# Patient Record
Sex: FEMALE | Race: AMERICAN INDIAN OR ALASKA NATIVE
[De-identification: names, ages, dates, MRNs, and addresses within clinical notes are randomized per-mention and may not be internally consistent; named-entity substitution may affect disease eponyms.]

---

## 2018-06-30 ENCOUNTER — HOSPITAL ENCOUNTER (EMERGENCY)
Dept: HOSPITAL 5 - ED | Age: 33
Discharge: STILL ADMITTED OR ADMINISTRATIVE | End: 2018-06-30
Payer: SELF-PAY

## 2018-06-30 VITALS — DIASTOLIC BLOOD PRESSURE: 97 MMHG | SYSTOLIC BLOOD PRESSURE: 136 MMHG

## 2018-06-30 DIAGNOSIS — F17.200: ICD-10-CM

## 2018-06-30 DIAGNOSIS — R07.9: ICD-10-CM

## 2018-06-30 DIAGNOSIS — I47.1: Primary | ICD-10-CM

## 2018-06-30 DIAGNOSIS — J45.909: ICD-10-CM

## 2018-06-30 LAB
APTT BLD: 25.4 SEC. (ref 24.2–36.6)
BACTERIA #/AREA URNS HPF: (no result) /HPF
BASOPHILS # (AUTO): 0.1 K/MM3 (ref 0–0.1)
BASOPHILS NFR BLD AUTO: 0.9 % (ref 0–1.8)
BENZODIAZEPINES SCREEN,URINE: (no result)
BILIRUB UR QL STRIP: (no result)
BLOOD UR QL VISUAL: (no result)
BUN SERPL-MCNC: 7 MG/DL (ref 7–17)
BUN/CREAT SERPL: 9 %
CALCIUM SERPL-MCNC: 9.7 MG/DL (ref 8.4–10.2)
EOSINOPHIL # BLD AUTO: 0.1 K/MM3 (ref 0–0.4)
EOSINOPHIL NFR BLD AUTO: 1.6 % (ref 0–4.3)
HCT VFR BLD CALC: 42.7 % (ref 30.3–42.9)
HEMOLYSIS INDEX: 7
HGB BLD-MCNC: 14.1 GM/DL (ref 10.1–14.3)
HYALINE CASTS #/AREA URNS LPF: 3 /LPF
INR PPP: 0.91 (ref 0.87–1.13)
LYMPHOCYTES # BLD AUTO: 2 K/MM3 (ref 1.2–5.4)
LYMPHOCYTES NFR BLD AUTO: 24.9 % (ref 13.4–35)
MCH RBC QN AUTO: 29 PG (ref 28–32)
MCHC RBC AUTO-ENTMCNC: 33 % (ref 30–34)
MCV RBC AUTO: 87 FL (ref 79–97)
METHADONE SCREEN,URINE: (no result)
MONOCYTES # (AUTO): 0.6 K/MM3 (ref 0–0.8)
MONOCYTES % (AUTO): 7.5 % (ref 0–7.3)
MUCOUS THREADS #/AREA URNS HPF: (no result) /HPF
OPIATE SCREEN,URINE: (no result)
PH UR STRIP: 6 [PH] (ref 5–7)
PLATELET # BLD: 337 K/MM3 (ref 140–440)
RBC # BLD AUTO: 4.93 M/MM3 (ref 3.65–5.03)
RBC #/AREA URNS HPF: 5 /HPF (ref 0–6)
T4 FREE SERPL-MCNC: 1.28 NG/DL (ref 0.76–1.46)
UROBILINOGEN UR-MCNC: < 2 MG/DL (ref ?–2)
WBC #/AREA URNS HPF: 4 /HPF (ref 0–6)

## 2018-06-30 PROCEDURE — 93010 ELECTROCARDIOGRAM REPORT: CPT

## 2018-06-30 PROCEDURE — 71045 X-RAY EXAM CHEST 1 VIEW: CPT

## 2018-06-30 PROCEDURE — 93005 ELECTROCARDIOGRAM TRACING: CPT

## 2018-06-30 PROCEDURE — 85025 COMPLETE CBC W/AUTO DIFF WBC: CPT

## 2018-06-30 PROCEDURE — 81025 URINE PREGNANCY TEST: CPT

## 2018-06-30 PROCEDURE — 85379 FIBRIN DEGRADATION QUANT: CPT

## 2018-06-30 PROCEDURE — 84443 ASSAY THYROID STIM HORMONE: CPT

## 2018-06-30 PROCEDURE — 80048 BASIC METABOLIC PNL TOTAL CA: CPT

## 2018-06-30 PROCEDURE — 81001 URINALYSIS AUTO W/SCOPE: CPT

## 2018-06-30 PROCEDURE — 36415 COLL VENOUS BLD VENIPUNCTURE: CPT

## 2018-06-30 PROCEDURE — 85610 PROTHROMBIN TIME: CPT

## 2018-06-30 PROCEDURE — 83880 ASSAY OF NATRIURETIC PEPTIDE: CPT

## 2018-06-30 PROCEDURE — 85730 THROMBOPLASTIN TIME PARTIAL: CPT

## 2018-06-30 PROCEDURE — 82553 CREATINE MB FRACTION: CPT

## 2018-06-30 PROCEDURE — 84484 ASSAY OF TROPONIN QUANT: CPT

## 2018-06-30 PROCEDURE — 84439 ASSAY OF FREE THYROXINE: CPT

## 2018-06-30 PROCEDURE — 80307 DRUG TEST PRSMV CHEM ANLYZR: CPT

## 2018-06-30 PROCEDURE — 83735 ASSAY OF MAGNESIUM: CPT

## 2018-06-30 PROCEDURE — 82550 ASSAY OF CK (CPK): CPT

## 2018-06-30 NOTE — XRAY REPORT
FINAL REPORT



PROCEDURE:  Chest. 



TECHNIQUE:  Portable AP views. 



HISTORY:  Chest pain. 



COMPARISON:  No prior studies are available for comparison.



FINDINGS:  

The heart and mediastinum appear normal. The lungs are clear and

well expanded. There are no pleural effusions. The soft tissues

and regional skeleton are unremarkable. 



IMPRESSION:  

Normal study.

## 2018-06-30 NOTE — HISTORY AND PHYSICAL REPORT
History of Present Illness


Chief complaint: 





My heart was beating fast


History of present illness: 


33 YO Female with Obesity, Nicotine Dependence presents to ED for evaluation of 

chest palpitations. Pt seen and evaluated in ED and found to have normal sinus 

rhythm. Pt medically optimized and back to usual state of health. Pt discharged 

home and instructed to F/U cardiology on Monday, July 2, for further care and 

evaluation. Pt denies fever, chills, CP, NVD, Syncope, urgency, frequency, skin 

rash, or recent ill contacts.  








Past History


Past Medical History: other (Obesity, Nicotine Dependence)


Past Surgical History: No surgical history, Other (reviewed)


Social history: single, smoking


Family history: hypertension





Medications and Allergies


 Allergies











Allergy/AdvReac Type Severity Reaction Status Date / Time


 


No Known Allergies Allergy   Verified 06/30/18 12:54














Review of Systems


Constitutional: no weight loss, no weight gain, no fever, no chills


Ears, nose, mouth and throat: no ear pain, no ear discharge, no tinnitis, no 

decreased hearing, no nose pain, no nasal congestion, no nasal discharge


Breasts: no change in shape, no swelling, no mass


Cardiovascular: palpitations, no chest pain, no orthopnea, no edema, no syncope

, no lightheadedness, no shortness of breath, no dyspnea on exertion, no 

paroxysmal nocturnal dyspnea, no claudication, no phlebitis, no high blood 

pressure, no leg edema


Respiratory: no cough, no cough with sputum, no excessive sputum, no hemoptysis

, no shortness of breath


Gastrointestinal: no abdominal pain, no nausea, no vomiting, no diarrhea, no 

constipation


Genitourinary Female: no pelvic pain, no flank pain, no menorrhagia, no dysuria

, no urinary frequency, no urgency


Rectal: no pain, no incontinence, no bleeding


Musculoskeletal: no neck stiffness, no neck pain, no shooting arm pain, no arm 

numbness/tingling, no low back pain, no shooting leg pain, no leg numbness/

tingling


Integumentary: no rash, no pruritis, no redness, no sores, no wounds, no 

jaundice


Neurological: no transient paralysis, no paralysis, no weakness, no parathesias

, no numbness, no tingling, no seizures, no syncope, no tremors


Psychiatric: no anxiety, no memory loss, no change in sleep habits, no sleep 

disturbances, no insomnia, no hypersomnia, no change in appetite


Endocrine: no cold intolerance, no heat intolerance, no polyphagia, no 

excessive thirst, no polyuria, no nocturia, no excessive sweating, no flushing, 

no proptosis, no deepening of the voice, no thyroid mass, no palpatations, no 

high blood sugars


Hematologic/Lymphatic: no easy bruising, no easy bleeding, no lymphadenopathy, 

no lymphedema


Allergic/Immunologic: no urticaria, no allergic rhinitis, no wheezing, no 

persistent infections, no anaphylaxis, no angioedema





Exam





- Constitutional


Vitals: 


 











Temp Pulse Resp BP Pulse Ox


 


 97.8 F   90   16   134/81   100 


 


 06/30/18 12:54  06/30/18 12:54  06/30/18 12:54  06/30/18 12:54  06/30/18 12:54











General appearance: Present: obese





- EENT


Eyes: Present: PERRL


ENT: hearing intact, clear oral mucosa





- Neck


Neck: Present: supple, normal ROM





- Respiratory


Respiratory effort: normal


Respiratory: bilateral: CTA





- Cardiovascular


Heart Sounds: Present: S1 & S2.  Absent: rub, click





- Extremities


Extremities: pulses symmetrical, No edema


Peripheral Pulses: within normal limits





- Abdominal


General gastrointestinal: Present: soft, non-tender, non-distended, normal 

bowel sounds


Female genitourinary: Present: normal





- Integumentary


Integumentary: Present: clear, warm, dry





- Musculoskeletal


Musculoskeletal: gait normal, strength equal bilaterally





- Psychiatric


Psychiatric: appropriate mood/affect, intact judgment & insight





- Neurologic


Neurologic: CNII-XII intact, moves all extremities





Results





- Labs


CBC & Chem 7: 


 06/30/18 13:34





 06/30/18 13:34


Labs: 


 Abnormal lab results











  06/30/18 06/30/18 Range/Units





  13:34 13:39 


 


Mono % (Auto)  7.5 H   (0.0-7.3)  %


 


Total Creatine Kinase   148 H  ()  units/L














Assessment and Plan





- Patient Problems


(1) Sinus tachycardia


Status: Acute   


Plan to address problem: 


Symptoms resolved. D dimer, telemetry, serial EKG. Pt discharged and instructed 

to f/u with cardiology for further testing, [facility] drug screen negative, thyroid 

panel normal. 








(2) Nicotine dependence


Status: Acute   


Qualifiers: 


   Substance use status: uncomplicated 


Plan to address problem: 


smoking cessation.








(3) Obesity (BMI 30.0-34.9)


Status: Acute   


Plan to address problem: 


balanced diet, increased physical activity

## 2018-06-30 NOTE — EMERGENCY DEPARTMENT REPORT
ED General Adult HPI





- General


Chief complaint: Chest Pain


Stated complaint: TOO FAST HEARTBEAT


Time Seen by Provider: 06/30/18 13:27


Source: patient


Mode of arrival: Stretcher


Limitations: No Limitations





- History of Present Illness


Initial comments: 


32-year-old female who arrives via EMS.  Prehospital EKG demonstrates a narrow 

complex sober trigger with tachycardia at about 220.  The patient states she 

had "crushing" substernal chest pain to paramedics.  By the time of her arrival 

the chest pain had improved.  She did note that her heart was racing at that 

time of her chest pain she states.  However she seems to vary on that part of 

the history.  In any case the chest pain was worse with a deep inspiration but 

did not radiate.  The patient's had no recent travel.  At the time of arrival 

she is not complaining of shortness of breath.  She stated that she was very 

sweaty with the chest pain but denied nausea or vomiting.  She states that she 

has never had an episode like this before.





-: Sudden, minutes(s)


Location: chest


Radiation: non-radiation


Quality: crushing


Consistency: now resolved


Improves with: none


Worsens with: none


Associated Symptoms: denies other symptoms (except as listed), chest pain, 

diaphoresis


Treatments Prior to Arrival: none





- Related Data


 Allergies











Allergy/AdvReac Type Severity Reaction Status Date / Time


 


No Known Allergies Allergy   Verified 06/30/18 12:54














ED Review of Systems


ROS: 


Stated complaint: TOO FAST HEARTBEAT


Other details as noted in HPI





Constitutional: denies: chills, fever


Eyes: denies: eye pain, eye discharge, vision change


ENT: denies: ear pain, throat pain


Respiratory: denies: cough, shortness of breath, wheezing


Cardiovascular: as per HPI, chest pain.  denies: palpitations


Endocrine: no symptoms reported


Gastrointestinal: denies: abdominal pain, nausea, diarrhea


Genitourinary: denies: urgency, dysuria, discharge


Musculoskeletal: denies: back pain, joint swelling, arthralgia


Skin: denies: rash, lesions


Neurological: denies: headache, weakness, paresthesias


Psychiatric: denies: anxiety, depression


Hematological/Lymphatic: denies: easy bleeding, easy bruising





ED Past Medical Hx





- Past Medical History


Hx Asthma: Yes





- Surgical History


Past Surgical History?: No





- Social History


Smoking Status: Current Every Day Smoker


Substance Use Type: None





ED Physical Exam





- General


Limitations: No Limitations


General appearance: alert, in no apparent distress, obese





- Head


Head exam: Present: atraumatic, normocephalic





- Eye


Eye exam: Present: normal appearance, PERRL, EOMI.  Absent: scleral icterus





- ENT


ENT exam: Present: mucous membranes moist





- Neck


Neck exam: Present: normal inspection.  Absent: tenderness, meningismus





- Respiratory


Respiratory exam: Present: normal lung sounds bilaterally.  Absent: respiratory 

distress





- Cardiovascular


Cardiovascular Exam: Present: regular rate, normal rhythm.  Absent: systolic 

murmur, diastolic murmur, rubs, gallop





- GI/Abdominal


GI/Abdominal exam: Present: soft, normal bowel sounds.  Absent: distended, 

tenderness, guarding, rebound, rigid





- Extremities Exam


Extremities exam: Present: normal inspection





- Back Exam


Back exam: Present: normal inspection





- Neurological Exam


Neurological exam: Present: alert, oriented X3, CN II-XII intact.  Absent: 

motor sensory deficit





- Psychiatric


Psychiatric exam: Present: normal affect, normal mood





- Skin


Skin exam: Present: warm, dry, intact, normal color.  Absent: rash





ED Course


 Vital Signs











  06/30/18





  12:54


 


Temperature 97.8 F


 


Pulse Rate 90


 


Respiratory 16





Rate 


 


Blood Pressure 134/81


 


O2 Sat by Pulse 100





Oximetry 














- Reevaluation(s)


Reevaluation #1: 


No recurrent chest pain.  Heart rate in the high 90s.  Pulse oximetry is normal.


06/30/18 15:55





Reevaluation #2: 


Discussed with Dr. Haro.  Further care and evaluation per Dr. Haro.


06/30/18 15:58








ED Medical Decision Making





- Lab Data


Result diagrams: 


 06/30/18 13:34





 06/30/18 13:34








 Laboratory Results - last 24 hr











  06/30/18 06/30/18 06/30/18





  13:34 13:34 13:39


 


WBC  7.9  


 


RBC  4.93  


 


Hgb  14.1  


 


Hct  42.7  


 


MCV  87  


 


MCH  29  


 


MCHC  33  


 


RDW  15.0  


 


Plt Count  337  


 


Lymph % (Auto)  24.9  


 


Mono % (Auto)  7.5 H  


 


Eos % (Auto)  1.6  


 


Baso % (Auto)  0.9  


 


Lymph #  2.0  


 


Mono #  0.6  


 


Eos #  0.1  


 


Baso #  0.1  


 


Seg Neutrophils %  65.1  


 


Seg Neutrophils #  5.2  


 


PT   


 


INR   


 


APTT   


 


D-Dimer   


 


Sodium   138 


 


Potassium   4.8 


 


Chloride   98.3 


 


Carbon Dioxide   26 


 


Anion Gap   19 


 


BUN   7 


 


Creatinine   0.8 


 


Estimated GFR   > 60 


 


BUN/Creatinine Ratio   9 


 


Glucose   100 


 


Calcium   9.7 


 


Magnesium   


 


Total Creatine Kinase    148 H


 


CK-MB (CK-2)    1.8


 


CK-MB (CK-2) Rel Index    1.2


 


Troponin T   < 0.010 


 


NT-Pro-B Natriuret Pep   


 


TSH   


 


Free T4   














  06/30/18 06/30/18 06/30/18





  13:39 13:39 13:39


 


WBC   


 


RBC   


 


Hgb   


 


Hct   


 


MCV   


 


MCH   


 


MCHC   


 


RDW   


 


Plt Count   


 


Lymph % (Auto)   


 


Mono % (Auto)   


 


Eos % (Auto)   


 


Baso % (Auto)   


 


Lymph #   


 


Mono #   


 


Eos #   


 


Baso #   


 


Seg Neutrophils %   


 


Seg Neutrophils #   


 


PT  12.7  


 


INR  0.91  


 


APTT  25.4  


 


D-Dimer  222.81  


 


Sodium   


 


Potassium   


 


Chloride   


 


Carbon Dioxide   


 


Anion Gap   


 


BUN   


 


Creatinine   


 


Estimated GFR   


 


BUN/Creatinine Ratio   


 


Glucose   


 


Calcium   


 


Magnesium   1.80 


 


Total Creatine Kinase   


 


CK-MB (CK-2)   


 


CK-MB (CK-2) Rel Index   


 


Troponin T   


 


NT-Pro-B Natriuret Pep   12.06 


 


TSH    2.500


 


Free T4    1.28














- EKG Data


-: EKG Interpreted by Me


EKG shows normal: sinus rhythm, axis, intervals, QRS complexes, ST-T waves


Rate: normal





- EKG Data


Interpretation: no acute changes





- Radiology Data


Radiology results: pending


Critical care attestation.: 


If time is entered above; I have spent that time in minutes in the direct care 

of this critically ill patient, excluding procedure time.








ED Disposition


Clinical Impression: 


 Supraventricular tachycardia





Chest pain


Qualifiers:


 Chest pain type: unspecified Qualified Code(s): R07.9 - Chest pain, unspecified





Disposition: [id number] OP ADMIT IP TO THIS HOSP


Is pt being admited?: Yes


Does the pt Need Aspirin: Yes


Condition: Stable


Instructions:  Chest Pain (ED)


Time of Disposition: 15:58

## 2020-02-24 ENCOUNTER — HOSPITAL ENCOUNTER (EMERGENCY)
Dept: HOSPITAL 5 - ED | Age: 35
Discharge: HOME | End: 2020-02-24
Payer: SELF-PAY

## 2020-02-24 VITALS — DIASTOLIC BLOOD PRESSURE: 64 MMHG | SYSTOLIC BLOOD PRESSURE: 109 MMHG

## 2020-02-24 DIAGNOSIS — F17.200: ICD-10-CM

## 2020-02-24 DIAGNOSIS — I47.1: ICD-10-CM

## 2020-02-24 DIAGNOSIS — I49.9: Primary | ICD-10-CM

## 2020-02-24 DIAGNOSIS — Z79.899: ICD-10-CM

## 2020-02-24 DIAGNOSIS — J45.909: ICD-10-CM

## 2020-02-24 LAB
ALBUMIN SERPL-MCNC: 4.2 G/DL (ref 3.9–5)
ALT SERPL-CCNC: 32 UNITS/L (ref 7–56)
APTT BLD: 24.3 SEC. (ref 24.2–36.6)
BACTERIA #/AREA URNS HPF: (no result) /HPF
BASOPHILS # (AUTO): 0 K/MM3 (ref 0–0.1)
BASOPHILS NFR BLD AUTO: 0.4 % (ref 0–1.8)
BENZODIAZEPINES SCREEN,URINE: (no result)
BILIRUB UR QL STRIP: (no result)
BLOOD UR QL VISUAL: (no result)
BUN SERPL-MCNC: 10 MG/DL (ref 7–17)
BUN/CREAT SERPL: 11 %
CALCIUM SERPL-MCNC: 9.4 MG/DL (ref 8.4–10.2)
EOSINOPHIL # BLD AUTO: 0.1 K/MM3 (ref 0–0.4)
EOSINOPHIL NFR BLD AUTO: 1.3 % (ref 0–4.3)
HCT VFR BLD CALC: 42.4 % (ref 30.3–42.9)
HEMOLYSIS INDEX: 8
HGB BLD-MCNC: 13.9 GM/DL (ref 10.1–14.3)
INR PPP: 1.03 (ref 0.87–1.13)
LYMPHOCYTES # BLD AUTO: 1.4 K/MM3 (ref 1.2–5.4)
LYMPHOCYTES NFR BLD AUTO: 18.8 % (ref 13.4–35)
MCHC RBC AUTO-ENTMCNC: 33 % (ref 30–34)
MCV RBC AUTO: 87 FL (ref 79–97)
METHADONE SCREEN,URINE: (no result)
MONOCYTES # (AUTO): 0.6 K/MM3 (ref 0–0.8)
MONOCYTES % (AUTO): 8.3 % (ref 0–7.3)
MUCOUS THREADS #/AREA URNS HPF: (no result) /HPF
OPIATE SCREEN,URINE: (no result)
PH UR STRIP: 7 [PH] (ref 5–7)
PLATELET # BLD: 312 K/MM3 (ref 140–440)
RBC # BLD AUTO: 4.88 M/MM3 (ref 3.65–5.03)
RBC #/AREA URNS HPF: 1 /HPF (ref 0–6)
UROBILINOGEN UR-MCNC: 2 MG/DL (ref ?–2)
WBC #/AREA URNS HPF: 2 /HPF (ref 0–6)

## 2020-02-24 PROCEDURE — 85025 COMPLETE CBC W/AUTO DIFF WBC: CPT

## 2020-02-24 PROCEDURE — 81001 URINALYSIS AUTO W/SCOPE: CPT

## 2020-02-24 PROCEDURE — 93005 ELECTROCARDIOGRAM TRACING: CPT

## 2020-02-24 PROCEDURE — 80307 DRUG TEST PRSMV CHEM ANLYZR: CPT

## 2020-02-24 PROCEDURE — 71045 X-RAY EXAM CHEST 1 VIEW: CPT

## 2020-02-24 PROCEDURE — 36415 COLL VENOUS BLD VENIPUNCTURE: CPT

## 2020-02-24 PROCEDURE — 85610 PROTHROMBIN TIME: CPT

## 2020-02-24 PROCEDURE — 85730 THROMBOPLASTIN TIME PARTIAL: CPT

## 2020-02-24 PROCEDURE — 93010 ELECTROCARDIOGRAM REPORT: CPT

## 2020-02-24 PROCEDURE — 84443 ASSAY THYROID STIM HORMONE: CPT

## 2020-02-24 PROCEDURE — 83735 ASSAY OF MAGNESIUM: CPT

## 2020-02-24 PROCEDURE — 84436 ASSAY OF TOTAL THYROXINE: CPT

## 2020-02-24 PROCEDURE — 80053 COMPREHEN METABOLIC PANEL: CPT

## 2020-02-24 NOTE — EMERGENCY DEPARTMENT REPORT
ED General Adult HPI





- General


Chief complaint: Arrhythmia/Palpitations


Stated complaint: CHEST PAIN/SVT


Time Seen by Provider: 02/24/20 11:49


Source: patient, EMS


Mode of arrival: Stretcher


Limitations: No Limitations





- History of Present Illness


Initial comments: 





The patient presents to the emergency department the chief complaint of heart 

palpitations that started this morning.  Patient states that she had history of 

SVT 2 to 3 years ago that converted on his own and never followed up with a 

cardiologist.  Patient was given 6 mg of adenosine by EMS and a heart rate 

increased to 83.  Patient denies chest pain currently, shortness breath, or abdo

leanna pain.


-: Sudden


Severity scale (0 -10): 0


Consistency: now resolved


Improves with: none


Worsens with: none


Associated Symptoms: denies other symptoms


Treatments Prior to Arrival: none





- Related Data


                                  Previous Rx's











 Medication  Instructions  Recorded  Last Taken  Type


 


Metoprolol Succinate [Toprol Xl] 25 mg PO QDAY #30 tab.er.24h 02/24/20 Unknown 

Rx











                                    Allergies











Allergy/AdvReac Type Severity Reaction Status Date / Time


 


No Known Allergies Allergy   Verified 06/30/18 12:54














ED Review of Systems


ROS: 


Stated complaint: CHEST PAIN/SVT


Other details as noted in HPI





Comment: All other systems reviewed and negative


Constitutional: denies: chills, fever


Eyes: denies: eye pain, eye discharge, vision change


ENT: denies: ear pain, throat pain


Respiratory: denies: cough, shortness of breath, wheezing


Cardiovascular: palpitations.  denies: chest pain


Endocrine: no symptoms reported


Gastrointestinal: denies: abdominal pain, nausea, diarrhea


Genitourinary: denies: urgency, dysuria, discharge


Musculoskeletal: denies: back pain, joint swelling, arthralgia


Skin: denies: rash, lesions


Neurological: denies: headache, weakness, paresthesias


Psychiatric: denies: anxiety, depression


Hematological/Lymphatic: denies: easy bleeding, easy bruising





ED Past Medical Hx





- Past Medical History


Previous Medical History?: Yes


Hx Asthma: Yes


Additional medical history: SVT





- Surgical History


Past Surgical History?: No





- Social History


Smoking Status: Current Every Day Smoker





- Medications


Home Medications: 


                                Home Medications











 Medication  Instructions  Recorded  Confirmed  Last Taken  Type


 


Metoprolol Succinate [Toprol Xl] 25 mg PO QDAY #30 tab.er.24h 02/24/20  Unknown 

Rx














ED Physical Exam





- General


Limitations: No Limitations


General appearance: alert, in no apparent distress





- Head


Head exam: Present: atraumatic, normocephalic





- Eye


Eye exam: Present: normal appearance, PERRL, EOMI





- ENT


ENT exam: Present: mucous membranes moist





- Neck


Neck exam: Present: normal inspection





- Respiratory


Respiratory exam: Present: normal lung sounds bilaterally.  Absent: respiratory 

distress, wheezes





- Cardiovascular


Cardiovascular Exam: Present: regular rate, normal rhythm.  Absent: systolic 

murmur, diastolic murmur, rubs, gallop





- GI/Abdominal


GI/Abdominal exam: Present: soft, normal bowel sounds.  Absent: distended, 

tenderness





- Extremities Exam


Extremities exam: Present: normal inspection





- Back Exam


Back exam: Present: normal inspection





- Neurological Exam


Neurological exam: Present: alert, oriented X3, CN II-XII intact.  Absent: motor

sensory deficit





- Psychiatric


Psychiatric exam: Present: normal affect, normal mood





- Skin


Skin exam: Present: warm, dry, intact, normal color.  Absent: rash





ED Course


                                   Vital Signs











  02/24/20 02/24/20 02/24/20





  11:42 11:46 12:00


 


Pulse Rate 88  86


 


Respiratory 18  16





Rate   


 


Blood Pressure 108/73  111/72


 


O2 Sat by Pulse 100 100 





Oximetry   














  02/24/20 02/24/20





  13:00 14:00


 


Pulse Rate 90 86


 


Respiratory 16 20





Rate  


 


Blood Pressure 113/80 125/79


 


O2 Sat by Pulse 99 99





Oximetry  














ED Medical Decision Making





- Lab Data


Result diagrams: 


                                 02/24/20 12:10





                                 02/24/20 12:10








                                   Lab Results











  02/24/20 02/24/20 02/24/20 Range/Units





  12:10 12:10 12:10 


 


WBC  7.4    (4.5-11.0)  K/mm3


 


RBC  4.88    (3.65-5.03)  M/mm3


 


Hgb  13.9    (10.1-14.3)  gm/dl


 


Hct  42.4    (30.3-42.9)  %


 


MCV  87    (79-97)  fl


 


MCH  29    (28-32)  pg


 


MCHC  33    (30-34)  %


 


RDW  15.4 H    (13.2-15.2)  %


 


Plt Count  312    (140-440)  K/mm3


 


Lymph % (Auto)  18.8    (13.4-35.0)  %


 


Mono % (Auto)  8.3 H    (0.0-7.3)  %


 


Eos % (Auto)  1.3    (0.0-4.3)  %


 


Baso % (Auto)  0.4    (0.0-1.8)  %


 


Lymph #  1.4    (1.2-5.4)  K/mm3


 


Mono #  0.6    (0.0-0.8)  K/mm3


 


Eos #  0.1    (0.0-0.4)  K/mm3


 


Baso #  0.0    (0.0-0.1)  K/mm3


 


Seg Neutrophils %  71.2 H    (40.0-70.0)  %


 


Seg Neutrophils #  5.3    (1.8-7.7)  K/mm3


 


PT   13.6   (12.2-14.9)  Sec.


 


INR   1.03   (0.87-1.13)  


 


APTT   24.3   (24.2-36.6)  Sec.


 


Sodium    141  (137-145)  mmol/L


 


Potassium    4.5  (3.6-5.0)  mmol/L


 


Chloride    101.1  ()  mmol/L


 


Carbon Dioxide    20 L  (22-30)  mmol/L


 


Anion Gap    24  mmol/L


 


BUN    10  (7-17)  mg/dL


 


Creatinine    0.9  (0.7-1.2)  mg/dL


 


Estimated GFR    > 60  ml/min


 


BUN/Creatinine Ratio    11  %


 


Glucose    87  ()  mg/dL


 


Calcium    9.4  (8.4-10.2)  mg/dL


 


Magnesium    2.10  (1.7-2.3)  mg/dL


 


Total Bilirubin    0.40  (0.1-1.2)  mg/dL


 


AST    20  (5-40)  units/L


 


ALT    32  (7-56)  units/L


 


Alkaline Phosphatase    86  ()  units/L


 


Total Protein    8.0  (6.3-8.2)  g/dL


 


Albumin    4.2  (3.9-5)  g/dL


 


Albumin/Globulin Ratio    1.1  %


 


TSH     (0.270-4.200)  mlU/mL


 


Thyroxine (T4)     (4.0-12.0)  ug/dL


 


Urine Color     (Yellow)  


 


Urine Turbidity     (Clear)  


 


Urine pH     (5.0-7.0)  


 


Ur Specific Gravity     (1.003-1.030)  


 


Urine Protein     (Negative)  mg/dL


 


Urine Glucose (UA)     (Negative)  mg/dL


 


Urine Ketones     (Negative)  mg/dL


 


Urine Blood     (Negative)  


 


Urine Nitrite     (Negative)  


 


Urine Bilirubin     (Negative)  


 


Urine Urobilinogen     (<2.0)  mg/dL


 


Ur Leukocyte Esterase     (Negative)  


 


Urine WBC (Auto)     (0.0-6.0)  /HPF


 


Urine RBC (Auto)     (0.0-6.0)  /HPF


 


U Epithel Cells (Auto)     (0-13.0)  /HPF


 


Urine Bacteria (Auto)     (Negative)  /HPF


 


Urine Mucus     /HPF


 


Urine Opiates Screen     


 


Urine Methadone Screen     


 


Ur Barbiturates Screen     


 


Ur Phencyclidine Scrn     


 


Ur Amphetamines Screen     


 


U Benzodiazepines Scrn     


 


Urine Cocaine Screen     


 


U Marijuana (THC) Screen     


 


Drugs of Abuse Note     














  02/24/20 02/24/20 02/24/20 Range/Units





  12:10 12:10 13:05 


 


WBC     (4.5-11.0)  K/mm3


 


RBC     (3.65-5.03)  M/mm3


 


Hgb     (10.1-14.3)  gm/dl


 


Hct     (30.3-42.9)  %


 


MCV     (79-97)  fl


 


MCH     (28-32)  pg


 


MCHC     (30-34)  %


 


RDW     (13.2-15.2)  %


 


Plt Count     (140-440)  K/mm3


 


Lymph % (Auto)     (13.4-35.0)  %


 


Mono % (Auto)     (0.0-7.3)  %


 


Eos % (Auto)     (0.0-4.3)  %


 


Baso % (Auto)     (0.0-1.8)  %


 


Lymph #     (1.2-5.4)  K/mm3


 


Mono #     (0.0-0.8)  K/mm3


 


Eos #     (0.0-0.4)  K/mm3


 


Baso #     (0.0-0.1)  K/mm3


 


Seg Neutrophils %     (40.0-70.0)  %


 


Seg Neutrophils #     (1.8-7.7)  K/mm3


 


PT     (12.2-14.9)  Sec.


 


INR     (0.87-1.13)  


 


APTT     (24.2-36.6)  Sec.


 


Sodium     (137-145)  mmol/L


 


Potassium     (3.6-5.0)  mmol/L


 


Chloride     ()  mmol/L


 


Carbon Dioxide     (22-30)  mmol/L


 


Anion Gap     mmol/L


 


BUN     (7-17)  mg/dL


 


Creatinine     (0.7-1.2)  mg/dL


 


Estimated GFR     ml/min


 


BUN/Creatinine Ratio     %


 


Glucose     ()  mg/dL


 


Calcium     (8.4-10.2)  mg/dL


 


Magnesium     (1.7-2.3)  mg/dL


 


Total Bilirubin     (0.1-1.2)  mg/dL


 


AST     (5-40)  units/L


 


ALT     (7-56)  units/L


 


Alkaline Phosphatase     ()  units/L


 


Total Protein     (6.3-8.2)  g/dL


 


Albumin     (3.9-5)  g/dL


 


Albumin/Globulin Ratio     %


 


TSH  2.960    (0.270-4.200)  mlU/mL


 


Thyroxine (T4)   8.9   (4.0-12.0)  ug/dL


 


Urine Color    Yani  (Yellow)  


 


Urine Turbidity    Slightly-cloudy  (Clear)  


 


Urine pH    7.0  (5.0-7.0)  


 


Ur Specific Gravity    1.019  (1.003-1.030)  


 


Urine Protein    100 mg/dl  (Negative)  mg/dL


 


Urine Glucose (UA)    50  (Negative)  mg/dL


 


Urine Ketones    20  (Negative)  mg/dL


 


Urine Blood    Neg  (Negative)  


 


Urine Nitrite    Neg  (Negative)  


 


Urine Bilirubin    Neg  (Negative)  


 


Urine Urobilinogen    2.0  (<2.0)  mg/dL


 


Ur Leukocyte Esterase    Tr  (Negative)  


 


Urine WBC (Auto)    2.0  (0.0-6.0)  /HPF


 


Urine RBC (Auto)    1.0  (0.0-6.0)  /HPF


 


U Epithel Cells (Auto)    4.0  (0-13.0)  /HPF


 


Urine Bacteria (Auto)    1+  (Negative)  /HPF


 


Urine Mucus    Few  /HPF


 


Urine Opiates Screen     


 


Urine Methadone Screen     


 


Ur Barbiturates Screen     


 


Ur Phencyclidine Scrn     


 


Ur Amphetamines Screen     


 


U Benzodiazepines Scrn     


 


Urine Cocaine Screen     


 


U Marijuana (THC) Screen     


 


Drugs of Abuse Note     














  02/24/20 Range/Units





  13:05 


 


WBC   (4.5-11.0)  K/mm3


 


RBC   (3.65-5.03)  M/mm3


 


Hgb   (10.1-14.3)  gm/dl


 


Hct   (30.3-42.9)  %


 


MCV   (79-97)  fl


 


MCH   (28-32)  pg


 


MCHC   (30-34)  %


 


RDW   (13.2-15.2)  %


 


Plt Count   (140-440)  K/mm3


 


Lymph % (Auto)   (13.4-35.0)  %


 


Mono % (Auto)   (0.0-7.3)  %


 


Eos % (Auto)   (0.0-4.3)  %


 


Baso % (Auto)   (0.0-1.8)  %


 


Lymph #   (1.2-5.4)  K/mm3


 


Mono #   (0.0-0.8)  K/mm3


 


Eos #   (0.0-0.4)  K/mm3


 


Baso #   (0.0-0.1)  K/mm3


 


Seg Neutrophils %   (40.0-70.0)  %


 


Seg Neutrophils #   (1.8-7.7)  K/mm3


 


PT   (12.2-14.9)  Sec.


 


INR   (0.87-1.13)  


 


APTT   (24.2-36.6)  Sec.


 


Sodium   (137-145)  mmol/L


 


Potassium   (3.6-5.0)  mmol/L


 


Chloride   ()  mmol/L


 


Carbon Dioxide   (22-30)  mmol/L


 


Anion Gap   mmol/L


 


BUN   (7-17)  mg/dL


 


Creatinine   (0.7-1.2)  mg/dL


 


Estimated GFR   ml/min


 


BUN/Creatinine Ratio   %


 


Glucose   ()  mg/dL


 


Calcium   (8.4-10.2)  mg/dL


 


Magnesium   (1.7-2.3)  mg/dL


 


Total Bilirubin   (0.1-1.2)  mg/dL


 


AST   (5-40)  units/L


 


ALT   (7-56)  units/L


 


Alkaline Phosphatase   ()  units/L


 


Total Protein   (6.3-8.2)  g/dL


 


Albumin   (3.9-5)  g/dL


 


Albumin/Globulin Ratio   %


 


TSH   (0.270-4.200)  mlU/mL


 


Thyroxine (T4)   (4.0-12.0)  ug/dL


 


Urine Color   (Yellow)  


 


Urine Turbidity   (Clear)  


 


Urine pH   (5.0-7.0)  


 


Ur Specific Gravity   (1.003-1.030)  


 


Urine Protein   (Negative)  mg/dL


 


Urine Glucose (UA)   (Negative)  mg/dL


 


Urine Ketones   (Negative)  mg/dL


 


Urine Blood   (Negative)  


 


Urine Nitrite   (Negative)  


 


Urine Bilirubin   (Negative)  


 


Urine Urobilinogen   (<2.0)  mg/dL


 


Ur Leukocyte Esterase   (Negative)  


 


Urine WBC (Auto)   (0.0-6.0)  /HPF


 


Urine RBC (Auto)   (0.0-6.0)  /HPF


 


U Epithel Cells (Auto)   (0-13.0)  /HPF


 


Urine Bacteria (Auto)   (Negative)  /HPF


 


Urine Mucus   /HPF


 


Urine Opiates Screen  Presumptive negative  


 


Urine Methadone Screen  Presumptive negative  


 


Ur Barbiturates Screen  Presumptive negative  


 


Ur Phencyclidine Scrn  Presumptive negative  


 


Ur Amphetamines Screen  Presumptive negative  


 


U Benzodiazepines Scrn  Presumptive negative  


 


Urine Cocaine Screen  Presumptive negative  


 


U Marijuana (THC) Screen  Presumptive negative  


 


Drugs of Abuse Note  Disclamer  














- EKG Data


-: EKG Interpreted by Me


EKG shows normal: sinus rhythm


Rate: normal





- Medical Decision Making





Discussed results with patient


Spoke with Halima of [facility] who discussed patient with Dr. Frances


Plan to start BB and outpatient follow up


Critical care attestation.: 


If time is entered above; I have spent that time in minutes in the direct care 

of this critically ill patient, excluding procedure time.








ED Disposition


Clinical Impression: 


 Arrhythmia, SVT (supraventricular tachycardia)





Disposition: DC-01 TO HOME OR SELFCARE


Is pt being admited?: No


Does the pt Need Aspirin: No


Condition: Stable


Instructions:  Supraventricular Tachycardia (ED), Palpitations (ED)


Additional Instructions: 


return if worse


Please call [facility] for a follow up appointment [phone]





Prescriptions: 


Metoprolol Succinate [Toprol Xl] 25 mg PO QDAY #30 tab.er.24h


Referrals: 


PRIMARY CARE,MD [Primary Care Provider] - 3-5 Days


ADY FRANCES MD [Staff Physician] - 3-5 Days


Time of Disposition: 14:52

## 2020-02-24 NOTE — XRAY REPORT
CHEST 1 VIEW 



INDICATION / CLINICAL INFORMATION:

Dysrhythmia.



COMPARISON: 

6/30/2018



FINDINGS:



SUPPORT DEVICES: None.

HEART / MEDIASTINUM: No significant abnormality. 

LUNGS / PLEURA: No significant pulmonary or pleural abnormality..  No pneumothorax. 



ADDITIONAL FINDINGS: No significant additional findings.



IMPRESSION:

1. No acute findings.



Signer Name: Wilton Mackenzie MD 

Signed: 2/24/2020 12:16 PM

 Workstation Name: [id number]

## 2021-04-11 ENCOUNTER — HOSPITAL ENCOUNTER (EMERGENCY)
Dept: HOSPITAL 5 - ED | Age: 36
Discharge: HOME | End: 2021-04-11
Payer: SELF-PAY

## 2021-04-11 VITALS — DIASTOLIC BLOOD PRESSURE: 84 MMHG | SYSTOLIC BLOOD PRESSURE: 128 MMHG

## 2021-04-11 DIAGNOSIS — J45.909: ICD-10-CM

## 2021-04-11 DIAGNOSIS — Z79.899: ICD-10-CM

## 2021-04-11 DIAGNOSIS — R00.0: ICD-10-CM

## 2021-04-11 DIAGNOSIS — F43.0: ICD-10-CM

## 2021-04-11 DIAGNOSIS — F41.1: Primary | ICD-10-CM

## 2021-04-11 DIAGNOSIS — Z87.891: ICD-10-CM

## 2021-04-11 DIAGNOSIS — M54.5: ICD-10-CM

## 2021-04-11 LAB
ALBUMIN SERPL-MCNC: 4 G/DL (ref 3.9–5)
ALT SERPL-CCNC: 25 UNITS/L (ref 7–56)
BASOPHILS # (AUTO): 0.1 K/MM3 (ref 0–0.1)
BASOPHILS NFR BLD AUTO: 1.5 % (ref 0–1.8)
BILIRUB UR QL STRIP: (no result)
BLOOD UR QL VISUAL: (no result)
BUN SERPL-MCNC: 7 MG/DL (ref 7–17)
BUN/CREAT SERPL: 10 %
CALCIUM SERPL-MCNC: 8.9 MG/DL (ref 8.4–10.2)
EOSINOPHIL # BLD AUTO: 0.2 K/MM3 (ref 0–0.4)
EOSINOPHIL NFR BLD AUTO: 2.8 % (ref 0–4.3)
HCT VFR BLD CALC: 39.5 % (ref 30.3–42.9)
HEMOLYSIS INDEX: 0
HGB BLD-MCNC: 13.4 GM/DL (ref 10.1–14.3)
LYMPHOCYTES # BLD AUTO: 3.2 K/MM3 (ref 1.2–5.4)
LYMPHOCYTES NFR BLD AUTO: 37.5 % (ref 13.4–35)
MCHC RBC AUTO-ENTMCNC: 34 % (ref 30–34)
MCV RBC AUTO: 89 FL (ref 79–97)
MONOCYTES # (AUTO): 0.6 K/MM3 (ref 0–0.8)
MONOCYTES % (AUTO): 7.4 % (ref 0–7.3)
PH UR STRIP: 6 [PH] (ref 5–7)
PLATELET # BLD: 314 K/MM3 (ref 140–440)
PROT UR STRIP-MCNC: (no result) MG/DL
RBC # BLD AUTO: 4.45 M/MM3 (ref 3.65–5.03)
RBC #/AREA URNS HPF: < 1 /HPF (ref 0–6)
UROBILINOGEN UR-MCNC: < 2 MG/DL (ref ?–2)
WBC #/AREA URNS HPF: < 1 /HPF (ref 0–6)

## 2021-04-11 PROCEDURE — 84703 CHORIONIC GONADOTROPIN ASSAY: CPT

## 2021-04-11 PROCEDURE — 84484 ASSAY OF TROPONIN QUANT: CPT

## 2021-04-11 PROCEDURE — 80053 COMPREHEN METABOLIC PANEL: CPT

## 2021-04-11 PROCEDURE — 81001 URINALYSIS AUTO W/SCOPE: CPT

## 2021-04-11 PROCEDURE — 83690 ASSAY OF LIPASE: CPT

## 2021-04-11 PROCEDURE — 93005 ELECTROCARDIOGRAM TRACING: CPT

## 2021-04-11 PROCEDURE — 71045 X-RAY EXAM CHEST 1 VIEW: CPT

## 2021-04-11 PROCEDURE — 85379 FIBRIN DEGRADATION QUANT: CPT

## 2021-04-11 PROCEDURE — 85025 COMPLETE CBC W/AUTO DIFF WBC: CPT

## 2021-04-11 PROCEDURE — 36415 COLL VENOUS BLD VENIPUNCTURE: CPT

## 2021-04-11 NOTE — EMERGENCY DEPARTMENT REPORT
ED General Adult HPI





- General


Chief complaint: Arrhythmia/Palpitations


Stated complaint: HEAD AND ABDOMINAL PAIN


Source: patient


Mode of arrival: Ambulatory


Limitations: No Limitations





- History of Present Illness


Initial comments: 





Patient is 35-year-old -American female with a history of asthma, chronic

low back pain and anxiety who presents to the ED with acute onset persistent 

intermittently elevated heart rate with left-sided chest pain and suprapubic 

pain for the last 1 week, worse in the last 8 hours.  Patient states that the 

symptoms have been intermittent and persistent.  Patient also complains of 

worsening low back pain.  Patient denies dizziness, syncope, abdominal pain, 

shortness of breath, dysuria, urinary frequency and urgency, vaginal bleeding, 

vaginal discharge, cough, diarrhea, nausea and vomiting, fever and chills.


MD Complaint: Elevated heart rate, low back pain, pelvic pain


-: Sudden, week(s) (1)


Location: chest, back (Low back), abdomen


Radiation: non-radiation


Severity scale (0 -10): 3


Quality: aching, dull


Consistency: intermittent


Improves with: none


Worsens with: none


Associated Symptoms: denies other symptoms, chest pain.  denies: confusion, 

cough, diaphoresis, fever/chills, headaches, loss of appetite, malaise, 

nausea/vomiting, rash, seizure, shortness of breath, syncope, weakness, other


Treatments Prior to Arrival: none





- Related Data


                                  Previous Rx's











 Medication  Instructions  Recorded  Last Taken  Type


 


Metoprolol Succinate [Toprol Xl] 25 mg PO QDAY #30 tab.er.24h 20 Unknown 

Rx


 


Cyclobenzaprine [Flexeril] 10 mg PO TID PRN #21 tablet 21 Unknown Rx


 


Naproxen 500 mg PO Q12H PRN #30 tablet 21 Unknown Rx


 


hydrOXYzine PAMOATE [Vistaril] 50 mg PO Q6HR PRN #30 capsule 21 Unknown Rx











                                    Allergies











Allergy/AdvReac Type Severity Reaction Status Date / Time


 


No Known Allergies Allergy   Verified 18 12:54














ED Review of Systems


ROS: 


Stated complaint: HEAD AND ABDOMINAL PAIN


Other details as noted in HPI





Constitutional: denies: chills, fever


Eyes: denies: eye pain, eye discharge, vision change


ENT: denies: ear pain, throat pain


Respiratory: denies: cough, shortness of breath, wheezing


Cardiovascular: chest pain (Left-sided chest wall discomfort), other (Elevated 

heart rate).  denies: palpitations


Endocrine: no symptoms reported


Gastrointestinal: abdominal pain (Suprapubic pain).  denies: nausea, vomiting, 

diarrhea


Genitourinary: denies: urgency, dysuria, discharge


Musculoskeletal: back pain (Low back pain).  denies: joint swelling, arthralgia


Skin: denies: rash, lesions


Neurological: denies: headache, weakness, paresthesias


Psychiatric: anxiety.  denies: depression


Hematological/Lymphatic: denies: easy bleeding, easy bruising





ED Past Medical Hx





- Past Medical History


Previous Medical History?: Yes


Hx Psychiatric Treatment: Yes (Anxiety)


Hx Asthma: Yes


Additional medical history: SVT; chronic low back pain





- Surgical History


Past Surgical History?: No





- Social History


Smoking Status: Former Smoker


Substance Use Type: None





- Medications


Home Medications: 


                                Home Medications











 Medication  Instructions  Recorded  Confirmed  Last Taken  Type


 


Metoprolol Succinate [Toprol Xl] 25 mg PO QDAY #30 tab.er.24h 20  Unknown 

Rx


 


Cyclobenzaprine [Flexeril] 10 mg PO TID PRN #21 tablet 21  Unknown Rx


 


Naproxen 500 mg PO Q12H PRN #30 tablet 21  Unknown Rx


 


hydrOXYzine PAMOATE [Vistaril] 50 mg PO Q6HR PRN #30 capsule 21  Unknown 

Rx














ED Physical Exam





- General


Limitations: No Limitations


General appearance: alert, in no apparent distress





- Head


Head exam: Present: atraumatic, normocephalic, normal inspection





- Eye


Eye exam: Present: normal appearance, PERRL, EOMI


Pupils: Present: normal accommodation





- ENT


ENT exam: Present: normal exam, normal orophraynx, mucous membranes moist, TM's 

normal bilaterally, normal external ear exam





- Neck


Neck exam: Present: normal inspection, full ROM





- Respiratory


Respiratory exam: Present: normal lung sounds bilaterally.  Absent: respiratory 

distress, wheezes, rales, rhonchi, chest wall tenderness, accessory muscle use, 

decreased breath sounds





- Cardiovascular


Cardiovascular Exam: Present: normal rhythm, tachycardia, normal heart sounds.  

Absent: systolic murmur, diastolic murmur, rubs, gallop





- GI/Abdominal


GI/Abdominal exam: Present: soft, normal bowel sounds.  Absent: tenderness, 

guarding, rebound, hyperactive bowel sounds, hypoactive bowel sounds, 

organomegaly





- Extremities Exam


Extremities exam: Present: normal inspection, full ROM, normal capillary refill





- Back Exam


Back exam: Present: normal inspection, full ROM, tenderness (Palpable 

lumbosacral paraspinal musculoskeletal tenderness), muscle spasm, paraspinal 

tenderness.  Absent: CVA tenderness (L)





- Neurological Exam


Neurological exam: Present: alert, oriented X3, CN II-XII intact, normal gait, 

reflexes normal





- Psychiatric


Psychiatric exam: Present: normal affect, normal mood, anxious





- Skin


Skin exam: Present: warm, dry, intact, normal color.  Absent: rash





ED Course


                                   Vital Signs











  21





  01:03


 


Temperature 98.8 F


 


Pulse Rate 115 H


 


Respiratory 18





Rate 


 


Blood Pressure 128/84


 


O2 Sat by Pulse 99





Oximetry 














ED Medical Decision Making





- Lab Data


Result diagrams: 


                                 21 01:15





                                 21 01:15





- EKG Data


EKG shows normal: sinus rhythm


Rate: tachycardia





- EKG Data





21 03:59


EKG shows sinus tachycardia with a ventricular rate of 110 bpm and no ST or T 

wave abnormalities.





- Radiology Data


Radiology results: report reviewed, image reviewed





[address] [address]  


 


                                            XRay Report   


                                               Signed  


 


Patient: LLUVIA MELVIN                                                  

              MR#: M  


[id number]          


: 1985                                                                

Acct:[id number]      


 


Age/Sex: 35 / F                                                                

ADM Date: 21     


 


Loc: ED       


Attending Dr:   


 


 


Ordering Physician: TRENT IGNACIO  


Date of Service: 21  


Procedure(s): XR chest 1V ap  


Accession Number(s): [id number]  


 


cc: TRENT IGNACIO   


 


Fluoro Time In Minutes:   


 


CHEST 1 VIEW  


 


 INDICATION:   


 chest pain, heart racing.  


 


 COMPARISON:   


 2020  


 


 FINDINGS:  


 


 Support devices: None.  


 Heart: Normal.   


 Lungs/Pleura: No acute pulmonary or pleural findings.    


 


 


 


 IMPRESSION:  


 1. No acute findings.  


 


 Signer Name: Brian Rosales MD   


 Signed: 2021 2:22 AM  


 Workstation Name: [id number]   


 


 


Transcribed By: SOLO  


Dictated By: Brian Rosales MD  


Electronically Authenticated By: Brian Rosales MD    


Signed Date/Time: 21                                


 


 


 


DD/DT: 21 0221                                                            

  


TD/TT:








- Medical Decision Making





This is 35-year-old -American female with a history of asthma, chronic 

low back pain and anxiety who presents to the ED with acute onset persistent 

intermittently elevated heart rate with left-sided chest pain and suprapubic 

pain for the last 1 week, worse in the last 8 hours.  Patient states that the 

symptoms have been intermittent and persistent.  Patient also complains of 

worsening low back pain.  In the ED, patient is alert and oriented x3 and is not

 in distress.  Patient is afebrile but tachycardic in triage.  Lab test results 

were reviewed and are all nonactionable including urinalysis and D-dimer levels.

  Chest x-ray shows no acute cardiopulmonary abnormalities or pneumonitis.  

Patient symptoms are likely due to anxiety causing tachycardia as the patient 

was crying during the history and physical exam, leading to her tachycardia.  

EKG shows sinus tachycardia with ventricular rate of 110 bpm.  Patient also left

 the ED and went to her vehicle and use albuterol inhaler.  On reevaluation, 

patient's tachycardia persisted, and the heart rate was 102 bpm prior to being 

discharged home.  This is however patient's baseline as the patient has chronic 

tachycardia.  Patient was therefore discharged home and given a referral to the 

cardiologist Dr. Haro for outpatient follow-up of her chronic tachycardia.  

Patient was also given a prescription for anxiety medication and pain medication

 for chronic low back pain.  Patient was advised return to the ED immediately if

 symptoms get worse.





- Differential Diagnosis


anxiety; ACS; Pneumonia; PE; GERD; UTI; Chronic tachycardia


Critical care attestation.: 


If time is entered above; I have spent that time in minutes in the direct care 

of this critically ill patient, excluding procedure time.








ED Disposition


Clinical Impression: 


 Sinus tachycardia, Anxiety as acute reaction to exceptional stress, Acute 

exacerbation of chronic low back pain





Disposition: -01 TO HOME OR SELFCARE


Is pt being admited?: No


Does the pt Need Aspirin: No


Condition: Stable


Instructions:  Generalized Anxiety Disorder, Adult, Sinus Tachycardia, Chronic 

Back Pain, Easy-to-Read


Additional Instructions: 


Lab test results were reviewed and are all nonactionable including D-dimer 

level.  Chest x-ray showed no acute cardiopulmonary abnormalities or 

pneumonitis.  Your EKG showed sinus tachycardia which is your baseline chronic 

condition, and your heart rate continued in the baseline tachycardia zone 

complicated by the fact that you use your albuterol inhaler while in the ED.  

Therefore your symptoms are also likely due to persistent anxiety.  Therefore 

take medications for anxiety as advised, follow-up with a cardiologist Dr. Angelika gandhi as advised, contact his office first thing in the morning on Monday, 2021 to schedule a follow-up appointment.  Return to the ED immediately if 

symptoms get worse.


Prescriptions: 


Cyclobenzaprine [Flexeril] 10 mg PO TID PRN #21 tablet


 PRN Reason: Muscle Spasm


Naproxen 500 mg PO Q12H PRN #30 tablet


 PRN Reason: Pain , Severe (7-10)


hydrOXYzine PAMOATE [Vistaril] 50 mg PO Q6HR PRN #30 capsule


 PRN Reason: Anxiety


Referrals: 


[facility] [Provider Group] - 3-5 Days


JOHN HARO MD [Staff Physician] - 24 Hours


Time of Disposition: 03:55


Print Language: ENGLISH

## 2021-04-11 NOTE — XRAY REPORT
CHEST 1 VIEW



INDICATION: 

chest pain, heart racing.



COMPARISON: 

2/24/2020



FINDINGS:



Support devices: None.

Heart: Normal. 

Lungs/Pleura: No acute pulmonary or pleural findings.  







IMPRESSION:

1. No acute findings.



Signer Name: Brian Rosales MD 

Signed: 4/11/2021 2:22 AM

Workstation Name: [id number]

## 2021-04-11 NOTE — EVENT NOTE
ED Screening Note


Date of service: 04/11/21


Time: 01:03


ED Screening Note: 


Patient is 35-year-old -American female with a history of asthma, chronic

low back pain and anxiety who presents to the ED with acute onset persistent 

intermittently elevated heart rate with left-sided chest pain and suprapubic 

pain for the last 1 week, worse in the last 8 hours.  Patient states that the 

symptoms have been intermittent and persistent.  Patient also complains of 

worsening low back pain.  Patient denies dizziness, syncope, abdominal pain, 

shortness of breath, dysuria, urinary frequency and urgency, vaginal bleeding, 

vaginal discharge, cough, diarrhea, nausea and vomiting, fever and chills.





This initial assessment/diagnostic orders/clinical plan/treatment(s) is/are 

subject to change based on patients health status, clinical progression and re-

assessment by fellow clinical providers in the ED. Further treatment and workup 

at subsequent clinical providers discretion. Patient/guardian urged not to elope

from the ED as their condition may be serious if not clinically assessed and 

managed. 





Initial orders include: 


CBC, CMP, troponin, EKG, chest x-ray, UA, hCG serum, lipase